# Patient Record
Sex: FEMALE | Race: WHITE | Employment: OTHER
[De-identification: names, ages, dates, MRNs, and addresses within clinical notes are randomized per-mention and may not be internally consistent; named-entity substitution may affect disease eponyms.]

---

## 2017-07-15 ENCOUNTER — HEALTH MAINTENANCE LETTER (OUTPATIENT)
Age: 42
End: 2017-07-15

## 2019-08-23 ENCOUNTER — HOSPITAL ENCOUNTER (EMERGENCY)
Facility: CLINIC | Age: 44
Discharge: HOME | End: 2019-08-23
Attending: EMERGENCY MEDICINE | Admitting: EMERGENCY MEDICINE
Payer: COMMERCIAL

## 2019-08-23 ENCOUNTER — APPOINTMENT (OUTPATIENT)
Dept: CT IMAGING | Facility: CLINIC | Age: 44
End: 2019-08-23
Attending: EMERGENCY MEDICINE
Payer: COMMERCIAL

## 2019-08-23 ENCOUNTER — APPOINTMENT (OUTPATIENT)
Dept: ULTRASOUND IMAGING | Facility: CLINIC | Age: 44
End: 2019-08-23
Attending: EMERGENCY MEDICINE
Payer: COMMERCIAL

## 2019-08-23 VITALS
BODY MASS INDEX: 23.2 KG/M2 | OXYGEN SATURATION: 98 % | RESPIRATION RATE: 16 BRPM | HEART RATE: 74 BPM | TEMPERATURE: 98.2 F | DIASTOLIC BLOOD PRESSURE: 69 MMHG | SYSTOLIC BLOOD PRESSURE: 112 MMHG | WEIGHT: 142 LBS

## 2019-08-23 DIAGNOSIS — R10.11 RUQ ABDOMINAL PAIN: ICD-10-CM

## 2019-08-23 LAB
ALBUMIN SERPL-MCNC: 4.1 G/DL (ref 3.4–5)
ALBUMIN UR-MCNC: 10 MG/DL
ALP SERPL-CCNC: 62 U/L (ref 40–150)
ALT SERPL W P-5'-P-CCNC: 31 U/L (ref 0–50)
ANION GAP SERPL CALCULATED.3IONS-SCNC: 10 MMOL/L (ref 3–14)
APPEARANCE UR: CLEAR
AST SERPL W P-5'-P-CCNC: 17 U/L (ref 0–45)
BACTERIA #/AREA URNS HPF: ABNORMAL /HPF
BASOPHILS # BLD AUTO: 0 10E9/L (ref 0–0.2)
BASOPHILS NFR BLD AUTO: 0.2 %
BILIRUB SERPL-MCNC: 0.7 MG/DL (ref 0.2–1.3)
BILIRUB UR QL STRIP: NEGATIVE
BUN SERPL-MCNC: 18 MG/DL (ref 7–30)
CALCIUM SERPL-MCNC: 9 MG/DL (ref 8.5–10.1)
CHLORIDE SERPL-SCNC: 103 MMOL/L (ref 94–109)
CO2 SERPL-SCNC: 26 MMOL/L (ref 20–32)
COLOR UR AUTO: YELLOW
CREAT SERPL-MCNC: 0.6 MG/DL (ref 0.52–1.04)
DIFFERENTIAL METHOD BLD: NORMAL
EOSINOPHIL # BLD AUTO: 0.1 10E9/L (ref 0–0.7)
EOSINOPHIL NFR BLD AUTO: 1.8 %
ERYTHROCYTE [DISTWIDTH] IN BLOOD BY AUTOMATED COUNT: 13.3 % (ref 10–15)
GFR SERPL CREATININE-BSD FRML MDRD: >90 ML/MIN/{1.73_M2}
GLUCOSE SERPL-MCNC: 83 MG/DL (ref 70–99)
GLUCOSE UR STRIP-MCNC: NEGATIVE MG/DL
HCG UR QL: NEGATIVE
HCT VFR BLD AUTO: 44.5 % (ref 35–47)
HGB BLD-MCNC: 14.7 G/DL (ref 11.7–15.7)
HGB UR QL STRIP: NEGATIVE
IMM GRANULOCYTES # BLD: 0 10E9/L (ref 0–0.4)
IMM GRANULOCYTES NFR BLD: 0 %
INTERNAL QC OK POCT: YES
KETONES UR STRIP-MCNC: NEGATIVE MG/DL
LEUKOCYTE ESTERASE UR QL STRIP: ABNORMAL
LIPASE SERPL-CCNC: 133 U/L (ref 73–393)
LYMPHOCYTES # BLD AUTO: 1.7 10E9/L (ref 0.8–5.3)
LYMPHOCYTES NFR BLD AUTO: 39.1 %
MCH RBC QN AUTO: 28.9 PG (ref 26.5–33)
MCHC RBC AUTO-ENTMCNC: 33 G/DL (ref 31.5–36.5)
MCV RBC AUTO: 87 FL (ref 78–100)
MONOCYTES # BLD AUTO: 0.4 10E9/L (ref 0–1.3)
MONOCYTES NFR BLD AUTO: 8 %
NEUTROPHILS # BLD AUTO: 2.2 10E9/L (ref 1.6–8.3)
NEUTROPHILS NFR BLD AUTO: 50.9 %
NITRATE UR QL: NEGATIVE
NRBC # BLD AUTO: 0 10*3/UL
NRBC BLD AUTO-RTO: 0 /100
PH UR STRIP: 7 PH (ref 5–7)
PLATELET # BLD AUTO: 226 10E9/L (ref 150–450)
POTASSIUM SERPL-SCNC: 4.1 MMOL/L (ref 3.4–5.3)
PROT SERPL-MCNC: 8.2 G/DL (ref 6.8–8.8)
RBC # BLD AUTO: 5.09 10E12/L (ref 3.8–5.2)
RBC #/AREA URNS AUTO: 1 /HPF (ref 0–2)
SODIUM SERPL-SCNC: 139 MMOL/L (ref 133–144)
SOURCE: ABNORMAL
SP GR UR STRIP: 1.01 (ref 1–1.03)
SQUAMOUS #/AREA URNS AUTO: 3 /HPF (ref 0–1)
UROBILINOGEN UR STRIP-MCNC: NORMAL MG/DL (ref 0–2)
WBC # BLD AUTO: 4.4 10E9/L (ref 4–11)
WBC #/AREA URNS AUTO: 4 /HPF (ref 0–5)

## 2019-08-23 PROCEDURE — 76705 ECHO EXAM OF ABDOMEN: CPT

## 2019-08-23 PROCEDURE — 96361 HYDRATE IV INFUSION ADD-ON: CPT

## 2019-08-23 PROCEDURE — 25000128 H RX IP 250 OP 636: Performed by: EMERGENCY MEDICINE

## 2019-08-23 PROCEDURE — 74177 CT ABD & PELVIS W/CONTRAST: CPT

## 2019-08-23 PROCEDURE — 25000125 ZZHC RX 250: Performed by: EMERGENCY MEDICINE

## 2019-08-23 PROCEDURE — 81025 URINE PREGNANCY TEST: CPT | Performed by: EMERGENCY MEDICINE

## 2019-08-23 PROCEDURE — 80053 COMPREHEN METABOLIC PANEL: CPT | Performed by: EMERGENCY MEDICINE

## 2019-08-23 PROCEDURE — 99284 EMERGENCY DEPT VISIT MOD MDM: CPT | Mod: Z6 | Performed by: EMERGENCY MEDICINE

## 2019-08-23 PROCEDURE — 83690 ASSAY OF LIPASE: CPT | Performed by: EMERGENCY MEDICINE

## 2019-08-23 PROCEDURE — 99285 EMERGENCY DEPT VISIT HI MDM: CPT | Mod: 25

## 2019-08-23 PROCEDURE — 81001 URINALYSIS AUTO W/SCOPE: CPT | Performed by: EMERGENCY MEDICINE

## 2019-08-23 PROCEDURE — 85025 COMPLETE CBC W/AUTO DIFF WBC: CPT | Performed by: EMERGENCY MEDICINE

## 2019-08-23 PROCEDURE — 96360 HYDRATION IV INFUSION INIT: CPT | Mod: 59

## 2019-08-23 PROCEDURE — 25000132 ZZH RX MED GY IP 250 OP 250 PS 637: Performed by: EMERGENCY MEDICINE

## 2019-08-23 RX ORDER — SODIUM CHLORIDE 9 MG/ML
1000 INJECTION, SOLUTION INTRAVENOUS CONTINUOUS
Status: DISCONTINUED | OUTPATIENT
Start: 2019-08-23 | End: 2019-08-23 | Stop reason: HOSPADM

## 2019-08-23 RX ORDER — IBUPROFEN 600 MG/1
600 TABLET, FILM COATED ORAL ONCE
Status: COMPLETED | OUTPATIENT
Start: 2019-08-23 | End: 2019-08-23

## 2019-08-23 RX ORDER — IOPAMIDOL 755 MG/ML
100 INJECTION, SOLUTION INTRAVASCULAR ONCE
Status: COMPLETED | OUTPATIENT
Start: 2019-08-23 | End: 2019-08-23

## 2019-08-23 RX ORDER — PANTOPRAZOLE SODIUM 20 MG/1
20 TABLET, DELAYED RELEASE ORAL DAILY
Qty: 14 TABLET | Refills: 0 | Status: SHIPPED | OUTPATIENT
Start: 2019-08-23 | End: 2019-09-06

## 2019-08-23 RX ORDER — SODIUM CHLORIDE 9 MG/ML
INJECTION, SOLUTION INTRAVENOUS
Status: DISCONTINUED
Start: 2019-08-23 | End: 2019-08-23 | Stop reason: HOSPADM

## 2019-08-23 RX ADMIN — IBUPROFEN 600 MG: 600 TABLET ORAL at 12:58

## 2019-08-23 RX ADMIN — SODIUM CHLORIDE 1000 ML: 9 INJECTION, SOLUTION INTRAVENOUS at 14:16

## 2019-08-23 RX ADMIN — IOPAMIDOL 69 ML: 755 INJECTION, SOLUTION INTRAVENOUS at 14:43

## 2019-08-23 RX ADMIN — LIDOCAINE HYDROCHLORIDE 30 ML: 20 SOLUTION ORAL; TOPICAL at 14:16

## 2019-08-23 RX ADMIN — SODIUM CHLORIDE 57 ML: 9 INJECTION, SOLUTION INTRAVENOUS at 14:43

## 2019-08-23 ASSESSMENT — ENCOUNTER SYMPTOMS
NAUSEA: 0
ABDOMINAL PAIN: 1
DIARRHEA: 1
DYSURIA: 0
FEVER: 0
COUGH: 0
BLOOD IN STOOL: 0
HEMATURIA: 0
VOMITING: 0

## 2019-08-23 NOTE — ED PROVIDER NOTES
[facility] [place] EMERGENCY DEPARTMENT ([facility])    8/23/19        History     Chief Complaint   Patient presents with     Abdominal Pain     Right upper quadrant pain; pain keeps pt up at night; pain started a couple months ago but very sporadic; increased duration and occurance the past week.     The history is provided by the patient and medical records.     Fiona Mcneal is a 44 year old female with a past medical history significant for migraines who presents here to the Emergency Department from  due to RUQ abdominal pain. Patient reports that she has noticed occasional abdominal pain over the last few months. She notes that these pains would randomly occur and would notice while bending up after stretching while working out. She notes that her pain has been increasing over the past few weeks, noting that over this past 1 week her pain has been even worse. She notes 2 episodes of pain yesterday that made her unable to move due to the severity of the pain. Patient states that she had stayed up late last night due to her pain and was concerned. She notes initially waking up this morning feeling well, however, her symptoms continued around 9 AM. She notes that her pain comes in waves but has been pretty constant today. Notes that the pain is a stabbing sensation. She notes that her RUQ abdominal pain is exacerbated by eating as well as bending/leaning over. Notes that she is currently on the Keto diet. Notes bloating of the abdomen after eating. Denies any current nausea but states on 8/16/2019 she had a bout of nausea where it felt like she was going to vomit that went away on its own. She also noted diarrhea from 8/18/2019-8/21/2019 that had resolved. Denied blood in her stool. Denies fever, hematuria, dysuria, vaginal discharge or vaginal bleeding. Denies chest pain or cough.  She denies any hematemesis, melena, or hematochezia.  Denies taking anything for the pain. Denies reflux or ulcers in the  past. Denies previous surgeries. Denies daily medications. She denies any anticoagulation. She reports she does intermittently smoke marijuana with last use last night. She states that she does not use this daily. She denies any alcohol use, tobacco use, or other illicit drug use. No recen trauma.     I have reviewed the Medications, Allergies, Past Medical and Surgical History, and Social History in the Epic system.    History reviewed. No pertinent past medical history.    History reviewed. No pertinent surgical history.    Family History   Problem Relation Age of Onset     Cerebrovascular Disease Father      Chemical Addiction Brother         Drugs Abuse       Social History     Tobacco Use     Smoking status: Never Smoker   Substance Use Topics     Alcohol use: No       No current facility-administered medications for this encounter.      Current Outpatient Medications   Medication     pantoprazole (PROTONIX) 20 MG EC tablet      No Known Allergies      Review of Systems   Constitutional: Negative for fever.   Respiratory: Negative for cough.    Cardiovascular: Negative for chest pain.   Gastrointestinal: Positive for abdominal pain (RUQ, stabbing) and diarrhea (Resolved). Negative for blood in stool, nausea and vomiting.   Genitourinary: Negative for dysuria, hematuria, vaginal bleeding and vaginal discharge.   All other systems reviewed and are negative.      Physical Exam   BP: 128/80  Pulse: 88  Temp: 98.2  F (36.8  C)  Resp: 14  Weight: 64.4 kg (142 lb)  SpO2: 96 %      Physical Exam   Constitutional: She is oriented to person, place, and time. She appears well-developed and well-nourished. No distress.   HENT:   Head: Normocephalic and atraumatic.   Mouth/Throat: No oropharyngeal exudate.   Eyes: Pupils are equal, round, and reactive to light. EOM are normal.   Neck: Normal range of motion. Neck supple.   Cardiovascular: Normal rate, regular rhythm, normal heart sounds and intact distal pulses.   No murmur  heard.  Pulmonary/Chest: Effort normal and breath sounds normal. No respiratory distress. She has no wheezes. She has no rales.   Abdominal: Soft. Bowel sounds are normal. She exhibits no distension and no mass. There is tenderness in the right upper quadrant. There is no rigidity, no rebound, no guarding, no CVA tenderness and no tenderness at McBurney's point.   Equivocal wheatley sign   Musculoskeletal: Normal range of motion. She exhibits no edema or tenderness.   Neurological: She is alert and oriented to person, place, and time. She has normal strength. No cranial nerve deficit or sensory deficit. GCS eye subscore is 4. GCS verbal subscore is 5. GCS motor subscore is 6.   Skin: Skin is warm and dry. Capillary refill takes less than 2 seconds. No rash noted.   Psychiatric: She has a normal mood and affect.   Vitals reviewed.      ED Course   12:27 PM  The patient was seen and examined by Tenisha Upton MD in Room ED02.        Procedures            Critical Care time:  none         Labs Ordered and Resulted from Time of ED Arrival Up to the Time of Departure from the ED   ROUTINE UA WITH MICROSCOPIC - Abnormal; Notable for the following components:       Result Value    Protein Albumin Urine 10 (*)     Leukocyte Esterase Urine Moderate (*)     Bacteria Urine Few (*)     Squamous Epithelial /HPF Urine 3 (*)     All other components within normal limits   HCG QUAL URINE POCT - Normal   CBC WITH PLATELETS DIFFERENTIAL   COMPREHENSIVE METABOLIC PANEL   LIPASE     CT Abdomen Pelvis w Contrast   Final Result   IMPRESSION:   1. Appendix not identified.   2. There are two small collapsing right ovarian cysts. Mild lower   pelvic fluid.       GULSHAN PATEL MD      Abdomen US, limited (RUQ only)   Final Result   IMPRESSION:     1.  No sonographic evidence of cholelithiasis or cholecystitis.   2.  Hepatomegaly.      PRABHA SHARPE MD                 Assessments & Plan (with Medical Decision Making)   On exam, patient is  overall well-appearing in no acute distress.  Vital signs are within normal limits and she is afebrile.  She was sent here from urgent care for further evaluation of right upper quadrant abdominal pain.  On exam she does have tenderness in the right upper quadrant area without rebound or guarding or signs of peritonitis.  She has an equivocal Hughes sign.  She does not have any lower abdominal tenderness or tenderness at McBurney's point to suggest appendicitis at this time.  Differential diagnosis includes but is not limited to gastritis, ulcer, pancreatitis, gallbladder pathology, marijuana hyperemesis,UTI, or musculoskeletal etiology.  We did consider more rare etiologies such as to Santi syndrome however the patient denies any history of STDs or concerns for recurrent STDs and has no change in her vaginal discharge so we felt this was less likely.  We did obtain laboratory studies as above which were all largely unremarkable.  Urinalysis is without evidence of UTI or hematuria.  Lipase is within normal limits.  Urine pregnancy is negative.  We did offer the patient the pain control and she requested ibuprofen.  We also provided her with a GI cocktail.  She also received IV fluids.  She was denying any current nausea and has not had any vomiting and thus we felt that marijuana induced hyperemesis would be less likely.    Right upper quadrant ultrasound did not reveal any evidence of cholelithiasis or cholecystitis.  There is no other evidence of acute abnormality.  Her liver was slightly enlarged.  Her examinations and liver function tests were within normal limits on her CMP.  Thus we felt that a calculus cholecystitis would be unlikely at this time.  On reevaluation she was still having tenderness on pelvis with contrast for further evaluation.  This revealed 2 small collapsing right ovarian cysts without other evidence of acute pathology.  There is no evidence of Ryland-Keron Santi syndrome.  I did speak with  the radiologist of the appendix was not definitely identified.  They did not see any signs suggestive of an inflammatory process or ruptured appendicitis.  On multiple abdominal re-evaluations the patient did not have any tenderness in the right lower quadrant or at McBurney's point and thus we felt that appendicitis was very unlikely.  Blood cell count was also within normal limits here.  On reevaluation after CT scan the patient was feeling improved and she did feel as though the GI cocktail was the most helpful.  Did discuss with her that the exact etiology of her pain is unknown at this time.  Thus we discussed the importance of her follow-up with her primary care provider in 3 days for reevaluation.  She was given a prescription for Protonix to take and that she could use Maalox at home as well.  She was given strict return precautions including worsening abdominal pain, localization of the pain to the right lower quadrant, fevers, repetitive vomiting, stool or vomit, or any new or worsening concerns.  She voiced understanding and was comfortable with this plan.  She was discharged home in improved condition.    I have reviewed the nursing notes.    I have reviewed the findings, diagnosis, plan and need for follow up with the patient.    Discharge Medication List as of 8/23/2019  4:12 PM      START taking these medications    Details   pantoprazole (PROTONIX) 20 MG EC tablet Take 1 tablet (20 mg) by mouth daily for 14 days, Disp-14 tablet, R-0, Local Print             Final diagnoses:   RUQ abdominal pain     Nate GUPTA, am serving as a trained medical scribe to document services personally performed by Tenisha Upton MD, based on the provider's statements to me.   Tenisha GUPTA MD, was physically present and have reviewed and verified the accuracy of this note documented by Nate Slade.    8/23/2019   [facility], EMERGENCY DEPARTMENT     Tenisha Upton MD  08/27/19 2309

## 2019-08-23 NOTE — ED NOTES
Pt discharged to home with self for transport. Pt stable at time of discharge. Verbalized understanding of printed discharge instructions. Denies further question for the provider.

## 2019-08-23 NOTE — DISCHARGE INSTRUCTIONS
Please make an appointment to follow up with Your Primary Care Provider in 3 days on Monday for re-evaluation. Take the protonix as prescribed. Can also take maalox at home for pain control. Return to the ED if you develop worsening pain, fevers, repetitive vomiting, pain localizing to the right lower quadrant, blood in the stool or vomit, or any new or worsening concerns.

## 2019-08-23 NOTE — ED AVS SNAPSHOT
[facility], [place], Emergency Department  [address]  [facility] [place] [address]  Phone:  [phone]  Fax:  [phone]                                    Fiona Mcneal   MRN: [id number]    Department:  [facility], Emergency Department   Date of Visit:  8/23/2019           After Visit Summary Signature Page    I have received my discharge instructions, and my questions have been answered. I have discussed any challenges I see with this plan with the nurse or doctor.    ..........................................................................................................................................  Patient/Patient Representative Signature      ..........................................................................................................................................  Patient Representative Print Name and Relationship to Patient    ..................................................               ................................................  Date                                   Time    ..........................................................................................................................................  Reviewed by Signature/Title    ...................................................              ..............................................  Date                                               Time          22EPIC Rev 08/18

## 2021-09-04 ENCOUNTER — HOSPITAL ENCOUNTER (EMERGENCY)
Facility: CLINIC | Age: 46
Discharge: HOME | End: 2021-09-04
Attending: EMERGENCY MEDICINE | Admitting: EMERGENCY MEDICINE
Payer: COMMERCIAL

## 2021-09-04 ENCOUNTER — APPOINTMENT (OUTPATIENT)
Dept: CT IMAGING | Facility: CLINIC | Age: 46
End: 2021-09-04
Attending: EMERGENCY MEDICINE
Payer: COMMERCIAL

## 2021-09-04 VITALS
SYSTOLIC BLOOD PRESSURE: 116 MMHG | HEART RATE: 88 BPM | OXYGEN SATURATION: 99 % | TEMPERATURE: 98.7 F | DIASTOLIC BLOOD PRESSURE: 63 MMHG | RESPIRATION RATE: 18 BRPM

## 2021-09-04 DIAGNOSIS — K57.32 DIVERTICULITIS OF COLON: ICD-10-CM

## 2021-09-04 LAB
ALBUMIN SERPL-MCNC: 3.3 G/DL (ref 3.4–5)
ALBUMIN UR-MCNC: NEGATIVE MG/DL
ALP SERPL-CCNC: 33 U/L (ref 40–150)
ALT SERPL W P-5'-P-CCNC: 32 U/L (ref 0–50)
ANION GAP SERPL CALCULATED.3IONS-SCNC: 5 MMOL/L (ref 3–14)
APPEARANCE UR: CLEAR
AST SERPL W P-5'-P-CCNC: 15 U/L (ref 0–45)
BASOPHILS # BLD AUTO: 0 10E3/UL (ref 0–0.2)
BASOPHILS NFR BLD AUTO: 0 %
BILIRUB SERPL-MCNC: 0.5 MG/DL (ref 0.2–1.3)
BILIRUB UR QL STRIP: NEGATIVE
BUN SERPL-MCNC: 12 MG/DL (ref 7–30)
CALCIUM SERPL-MCNC: 8.4 MG/DL (ref 8.5–10.1)
CHLORIDE BLD-SCNC: 102 MMOL/L (ref 94–109)
CO2 SERPL-SCNC: 28 MMOL/L (ref 20–32)
COLOR UR AUTO: ABNORMAL
CREAT SERPL-MCNC: 0.6 MG/DL (ref 0.52–1.04)
EOSINOPHIL # BLD AUTO: 0.1 10E3/UL (ref 0–0.7)
EOSINOPHIL NFR BLD AUTO: 1 %
ERYTHROCYTE [DISTWIDTH] IN BLOOD BY AUTOMATED COUNT: 12.7 % (ref 10–15)
GFR SERPL CREATININE-BSD FRML MDRD: >90 ML/MIN/1.73M2
GLUCOSE BLD-MCNC: 80 MG/DL (ref 70–99)
GLUCOSE UR STRIP-MCNC: NEGATIVE MG/DL
HCG UR QL: NEGATIVE
HCT VFR BLD AUTO: 38.7 % (ref 35–47)
HGB BLD-MCNC: 13.2 G/DL (ref 11.7–15.7)
HGB UR QL STRIP: NEGATIVE
IMM GRANULOCYTES # BLD: 0 10E3/UL
IMM GRANULOCYTES NFR BLD: 0 %
KETONES UR STRIP-MCNC: NEGATIVE MG/DL
LEUKOCYTE ESTERASE UR QL STRIP: NEGATIVE
LIPASE SERPL-CCNC: 120 U/L (ref 73–393)
LYMPHOCYTES # BLD AUTO: 1.8 10E3/UL (ref 0.8–5.3)
LYMPHOCYTES NFR BLD AUTO: 19 %
MCH RBC QN AUTO: 29.9 PG (ref 26.5–33)
MCHC RBC AUTO-ENTMCNC: 34.1 G/DL (ref 31.5–36.5)
MCV RBC AUTO: 88 FL (ref 78–100)
MONOCYTES # BLD AUTO: 0.7 10E3/UL (ref 0–1.3)
MONOCYTES NFR BLD AUTO: 8 %
MUCOUS THREADS #/AREA URNS LPF: PRESENT /LPF
NEUTROPHILS # BLD AUTO: 6.8 10E3/UL (ref 1.6–8.3)
NEUTROPHILS NFR BLD AUTO: 72 %
NITRATE UR QL: NEGATIVE
NRBC # BLD AUTO: 0 10E3/UL
NRBC BLD AUTO-RTO: 0 /100
PH UR STRIP: 7 [PH] (ref 5–7)
PLATELET # BLD AUTO: 193 10E3/UL (ref 150–450)
POTASSIUM BLD-SCNC: 3.7 MMOL/L (ref 3.4–5.3)
PROT SERPL-MCNC: 6.9 G/DL (ref 6.8–8.8)
RBC # BLD AUTO: 4.42 10E6/UL (ref 3.8–5.2)
RBC URINE: 1 /HPF
SODIUM SERPL-SCNC: 135 MMOL/L (ref 133–144)
SP GR UR STRIP: 1.01 (ref 1–1.03)
SQUAMOUS EPITHELIAL: 1 /HPF
UROBILINOGEN UR STRIP-MCNC: NORMAL MG/DL
WBC # BLD AUTO: 9.3 10E3/UL (ref 4–11)
WBC URINE: <1 /HPF

## 2021-09-04 PROCEDURE — 80053 COMPREHEN METABOLIC PANEL: CPT | Performed by: EMERGENCY MEDICINE

## 2021-09-04 PROCEDURE — 99285 EMERGENCY DEPT VISIT HI MDM: CPT | Performed by: EMERGENCY MEDICINE

## 2021-09-04 PROCEDURE — 250N000009 HC RX 250: Performed by: EMERGENCY MEDICINE

## 2021-09-04 PROCEDURE — 96375 TX/PRO/DX INJ NEW DRUG ADDON: CPT | Performed by: EMERGENCY MEDICINE

## 2021-09-04 PROCEDURE — 81025 URINE PREGNANCY TEST: CPT | Performed by: EMERGENCY MEDICINE

## 2021-09-04 PROCEDURE — 250N000011 HC RX IP 250 OP 636: Performed by: EMERGENCY MEDICINE

## 2021-09-04 PROCEDURE — 99285 EMERGENCY DEPT VISIT HI MDM: CPT | Mod: 25 | Performed by: EMERGENCY MEDICINE

## 2021-09-04 PROCEDURE — 96374 THER/PROPH/DIAG INJ IV PUSH: CPT | Mod: 59 | Performed by: EMERGENCY MEDICINE

## 2021-09-04 PROCEDURE — 83690 ASSAY OF LIPASE: CPT | Performed by: EMERGENCY MEDICINE

## 2021-09-04 PROCEDURE — 36415 COLL VENOUS BLD VENIPUNCTURE: CPT | Performed by: EMERGENCY MEDICINE

## 2021-09-04 PROCEDURE — 74177 CT ABD & PELVIS W/CONTRAST: CPT

## 2021-09-04 PROCEDURE — 250N000013 HC RX MED GY IP 250 OP 250 PS 637: Performed by: EMERGENCY MEDICINE

## 2021-09-04 PROCEDURE — 85025 COMPLETE CBC W/AUTO DIFF WBC: CPT | Performed by: EMERGENCY MEDICINE

## 2021-09-04 PROCEDURE — 81001 URINALYSIS AUTO W/SCOPE: CPT | Performed by: EMERGENCY MEDICINE

## 2021-09-04 RX ORDER — HYDROMORPHONE HYDROCHLORIDE 1 MG/ML
0.5 INJECTION, SOLUTION INTRAMUSCULAR; INTRAVENOUS; SUBCUTANEOUS
Status: COMPLETED | OUTPATIENT
Start: 2021-09-04 | End: 2021-09-04

## 2021-09-04 RX ORDER — ONDANSETRON 2 MG/ML
4 INJECTION INTRAMUSCULAR; INTRAVENOUS ONCE
Status: COMPLETED | OUTPATIENT
Start: 2021-09-04 | End: 2021-09-04

## 2021-09-04 RX ORDER — IOPAMIDOL 755 MG/ML
100 INJECTION, SOLUTION INTRAVASCULAR ONCE
Status: COMPLETED | OUTPATIENT
Start: 2021-09-04 | End: 2021-09-04

## 2021-09-04 RX ORDER — CIPROFLOXACIN 500 MG/1
500 TABLET, FILM COATED ORAL ONCE
Status: COMPLETED | OUTPATIENT
Start: 2021-09-04 | End: 2021-09-04

## 2021-09-04 RX ORDER — HYDROMORPHONE HCL IN WATER/PF 6 MG/30 ML
0.2 PATIENT CONTROLLED ANALGESIA SYRINGE INTRAVENOUS
Status: DISCONTINUED | OUTPATIENT
Start: 2021-09-04 | End: 2021-09-04

## 2021-09-04 RX ORDER — OXYCODONE HYDROCHLORIDE 5 MG/1
5 TABLET ORAL EVERY 6 HOURS PRN
Qty: 8 TABLET | Refills: 0 | Status: SHIPPED | OUTPATIENT
Start: 2021-09-04

## 2021-09-04 RX ORDER — METRONIDAZOLE 500 MG/1
500 TABLET ORAL ONCE
Status: COMPLETED | OUTPATIENT
Start: 2021-09-04 | End: 2021-09-04

## 2021-09-04 RX ORDER — METRONIDAZOLE 500 MG/1
500 TABLET ORAL 3 TIMES DAILY
Qty: 30 TABLET | Refills: 0 | Status: SHIPPED | OUTPATIENT
Start: 2021-09-04 | End: 2021-09-14

## 2021-09-04 RX ORDER — CIPROFLOXACIN 500 MG/1
500 TABLET, FILM COATED ORAL 2 TIMES DAILY
Qty: 20 TABLET | Refills: 0 | Status: SHIPPED | OUTPATIENT
Start: 2021-09-04

## 2021-09-04 RX ADMIN — SODIUM CHLORIDE 57 ML: 9 INJECTION, SOLUTION INTRAVENOUS at 03:00

## 2021-09-04 RX ADMIN — CIPROFLOXACIN 500 MG: 500 TABLET, COATED ORAL at 04:11

## 2021-09-04 RX ADMIN — IOPAMIDOL 69 ML: 755 INJECTION, SOLUTION INTRAVENOUS at 02:57

## 2021-09-04 RX ADMIN — METRONIDAZOLE 500 MG: 500 TABLET ORAL at 04:11

## 2021-09-04 RX ADMIN — ONDANSETRON 4 MG: 2 INJECTION INTRAMUSCULAR; INTRAVENOUS at 01:42

## 2021-09-04 RX ADMIN — HYDROMORPHONE HYDROCHLORIDE 0.5 MG: 1 INJECTION, SOLUTION INTRAMUSCULAR; INTRAVENOUS; SUBCUTANEOUS at 01:42

## 2021-09-04 ASSESSMENT — ENCOUNTER SYMPTOMS
CONFUSION: 0
COLOR CHANGE: 0
FATIGUE: 0
FREQUENCY: 0
CHEST TIGHTNESS: 0
DIZZINESS: 0
ABDOMINAL PAIN: 1
ABDOMINAL DISTENTION: 0
WEAKNESS: 0
BACK PAIN: 0
VOMITING: 0
MYALGIAS: 0
ARTHRALGIAS: 0
FEVER: 0
PALPITATIONS: 0
SHORTNESS OF BREATH: 0
DIARRHEA: 1
EYE PAIN: 0
NECK PAIN: 0
CONSTIPATION: 0
DIFFICULTY URINATING: 0
COUGH: 0
SORE THROAT: 0
CHILLS: 1
NAUSEA: 1
DYSURIA: 0
HEADACHES: 0

## 2021-09-04 NOTE — ED TRIAGE NOTES
Gradual onset and worsening of LLQ but diffuse lower abd pain that began around 1 PM worse around 8 PM. Last week it felt like she was getting a stomach ulcer. Reports she has passing gas and had a little diarhea that was tan in color with red chunks but admits she ate a beef stick.  + nausea. Denies fever but rpeorts when on the toilet she was having temps of 99 and cold chills.

## 2021-09-04 NOTE — DISCHARGE INSTRUCTIONS
Your symptoms are caused by diverticulitis.  You are prescribed 2 antibiotics (metronidazole and ciprofloxacin), which you should take as directed.  Initially, you should consume only a liquid diet.  Nutritional supplementation such as protein shakes are okay.  Once your symptoms begin to improve, you can start consuming a low fiber diet.  See attached information for dietary recommendations.  It is important that you follow-up with your primary care doctor in 3 to 5 days for reassessment of your symptoms.  Return to the emergency department immediately if you develop any worsening abdominal pain, fevers, intractable nausea/vomiting, or feel that you are worsening in any way.

## 2021-09-04 NOTE — ED PROVIDER NOTES
ED Provider Note  [facility]      History     Chief Complaint   Patient presents with     Abdominal Pain     Nausea     The history is provided by the patient and medical records.     Fiona Mcneal is a 46 year old otherwise healthy female who presents to the ED for an evaluation of LLQ abdominal pain and nausea. Patient reports she was having abdominal pain last week but took acid reducers and she started to feel better. Today at 1pm she had abdominal cramping that continues to worsen. She states she is having difficulty passing gas or stool since the pain began. She came from work at 4:30 and used heating pads to relieve the pain. Patient notes nausea, but no vomiting. She tried to make a BM later on in the evening and started to have sharp stabbing pains but did pass a little gas and diarrhea. She felt mildly better after that. She took a 30 minute nap but woke up with sharp pains again. She states the pain is constant but there are intermittent stabbing pain episodes. Denies pain radiating to the groin. She notes small red dots in her stool but does not know if that was blood or the beef jerky she ate earlier in the day. No vaginal pain or discharge, but there has been an abnormal odor for the last week. She states that she has the urgency to pee, but it takes a minute for her to start urinating.  Has not had menstrual cycle in years. Patient reports leg cramping for the past two months and was seen and treated for that by her PCP. Denies surgical history. Denies tobacco or alcohol use, but does smoke marijuana.    Past Medical History  History reviewed. No pertinent past medical history.  History reviewed. No pertinent surgical history.  ciprofloxacin (CIPRO) 500 MG tablet  metroNIDAZOLE (FLAGYL) 500 MG tablet  oxyCODONE (ROXICODONE) 5 MG tablet      No Known Allergies  Family History  Family History   Problem Relation Age of Onset     Cerebrovascular Disease Father       Chemical Addiction Brother         Drugs Abuse     Social History   Social History     Tobacco Use     Smoking status: Never Smoker   Substance Use Topics     Alcohol use: No     Drug use: No      Past medical history, past surgical history, medications, allergies, family history, and social history were reviewed with the patient. No additional pertinent items.       Review of Systems   Constitutional: Positive for chills. Negative for fatigue and fever.   HENT: Negative for congestion and sore throat.    Eyes: Negative for pain and visual disturbance.   Respiratory: Negative for cough, chest tightness and shortness of breath.    Cardiovascular: Negative for chest pain and palpitations.   Gastrointestinal: Positive for abdominal pain (LLQ), diarrhea and nausea. Negative for abdominal distention, constipation and vomiting.   Genitourinary: Negative for difficulty urinating, dysuria, frequency and urgency.   Musculoskeletal: Negative for arthralgias, back pain, myalgias and neck pain.   Skin: Negative for color change and rash.   Neurological: Negative for dizziness, weakness and headaches.   Psychiatric/Behavioral: Negative for confusion.     A complete review of systems was performed with pertinent positives and negatives noted in the HPI, and all other systems negative.    Physical Exam   BP: 113/78  Pulse: 97  Temp: 98.6  F (37  C)  Resp: 18  SpO2: 97 %  Physical Exam  Vitals and nursing note reviewed.   Constitutional:       General: She is not in acute distress.     Appearance: Normal appearance. She is not ill-appearing or toxic-appearing.   HENT:      Head: Normocephalic and atraumatic.      Nose: Nose normal.      Mouth/Throat:      Mouth: Mucous membranes are moist.   Eyes:      Pupils: Pupils are equal, round, and reactive to light.   Cardiovascular:      Rate and Rhythm: Normal rate.      Pulses: Normal pulses.      Heart sounds: Normal heart sounds.   Pulmonary:      Effort: Pulmonary effort is normal. No  respiratory distress.      Breath sounds: Normal breath sounds.   Abdominal:      General: Abdomen is flat. There is no distension.      Palpations: There is no mass.      Tenderness: There is no right CVA tenderness or left CVA tenderness.      Hernia: No hernia is present.      Comments: Mildly tender to palpation in the left lower quadrant.  No guarding rebound or other peritoneal signs.  Abdomen is otherwise soft.   Musculoskeletal:         General: No swelling or deformity. Normal range of motion.      Cervical back: Normal range of motion. No rigidity.   Skin:     General: Skin is warm.      Capillary Refill: Capillary refill takes less than 2 seconds.   Neurological:      Mental Status: She is alert and oriented to person, place, and time.   Psychiatric:         Mood and Affect: Mood normal.         ED Course      Procedures       The medical record was reviewed and interpreted.  Current labs reviewed and interpreted.  Previous labs reviewed and interpreted.  Managed outpatient prescription medications.       Results for orders placed or performed during the hospital encounter of 09/04/21   CT Abdomen Pelvis w Contrast     Status: None    Narrative    EXAM: CT ABDOMEN PELVIS W CONTRAST  LOCATION: [facility]  DATE/TIME: 9/4/2021 2:49 AM    INDICATION: Left lower quadrant pain.  COMPARISON: CT from 08/23/2019.  TECHNIQUE: CT scan of the abdomen and pelvis was performed following injection of IV contrast. Multiplanar reformats were obtained. Dose reduction techniques were used.  CONTRAST: 69mL isovue-370    FINDINGS:   LOWER CHEST: Normal.    HEPATOBILIARY: Normal gallbladder. Slight reticulation of the enhancement of the peripheral liver parenchyma, particularly in segments 5 and 6. Trace perihepatic fluid. The liver is enlarged, measuring 22 cm craniocaudal.    PANCREAS: Normal.    SPLEEN: There are a few scattered peripheral hypodensities in the spleen which are  stable compared to the prior CT.    ADRENAL GLANDS: Normal.    KIDNEYS/BLADDER: Symmetric enhancement. No hydronephrosis. Normal bladder.    BOWEL: Distal colonic diverticulosis. Marked wall thickening of the mid sigmoid colon with adjacent pericolonic edema. Normal caliber small bowel. No free air. Appendix not visualized with certainty.    LYMPH NODES: Normal.    VASCULATURE: Unremarkable.    PELVIC ORGANS: Pronounced parametrial vessels which can reflect a component of pelvic venous congestion. Small amount of free fluid.    MUSCULOSKELETAL: Normal.      Impression    IMPRESSION:   1.  Acute diverticulitis of the sigmoid colon. No free air. There is free fluid throughout the pelvis.    2.  Hepatomegaly with reticulated enhancement of the liver periphery which could reflect a component of passive venous congestion.   CBC with platelets differential     Status: None    Narrative    The following orders were created for panel order CBC with platelets differential.  Procedure                               Abnormality         Status                     ---------                               -----------         ------                     CBC with platelets and d...[[id number]]                      Final result                 Please view results for these tests on the individual orders.   Comprehensive metabolic panel     Status: Abnormal   Result Value Ref Range    Sodium 135 133 - 144 mmol/L    Potassium 3.7 3.4 - 5.3 mmol/L    Chloride 102 94 - 109 mmol/L    Carbon Dioxide (CO2) 28 20 - 32 mmol/L    Anion Gap 5 3 - 14 mmol/L    Urea Nitrogen 12 7 - 30 mg/dL    Creatinine 0.60 0.52 - 1.04 mg/dL    Calcium 8.4 (L) 8.5 - 10.1 mg/dL    Glucose 80 70 - 99 mg/dL    Alkaline Phosphatase 33 (L) 40 - 150 U/L    AST 15 0 - 45 U/L    ALT 32 0 - 50 U/L    Protein Total 6.9 6.8 - 8.8 g/dL    Albumin 3.3 (L) 3.4 - 5.0 g/dL    Bilirubin Total 0.5 0.2 - 1.3 mg/dL    GFR Estimate >90 >60 mL/min/1.73m2   Lipase     Status: Normal    Result Value Ref Range    Lipase 120 73 - 393 U/L   UA with Microscopic reflex to Culture     Status: Abnormal    Specimen: Urine, Clean Catch   Result Value Ref Range    Color Urine Light Yellow Colorless, Straw, Light Yellow, Yellow    Appearance Urine Clear Clear    Glucose Urine Negative Negative mg/dL    Bilirubin Urine Negative Negative    Ketones Urine Negative Negative mg/dL    Specific Gravity Urine 1.012 1.003 - 1.035    Blood Urine Negative Negative    pH Urine 7.0 5.0 - 7.0    Protein Albumin Urine Negative Negative mg/dL    Urobilinogen Urine Normal Normal, 2.0 mg/dL    Nitrite Urine Negative Negative    Leukocyte Esterase Urine Negative Negative    Mucus Urine Present (A) None Seen /LPF    RBC Urine 1 <=2 /HPF    WBC Urine <1 <=5 /HPF    Squamous Epithelials Urine 1 <=1 /HPF    Narrative    Urine Culture not indicated   HCG qualitative urine (UPT)     Status: Normal   Result Value Ref Range    hCG Urine Qualitative Negative Negative   CBC with platelets and differential     Status: None   Result Value Ref Range    WBC Count 9.3 4.0 - 11.0 10e3/uL    RBC Count 4.42 3.80 - 5.20 10e6/uL    Hemoglobin 13.2 11.7 - 15.7 g/dL    Hematocrit 38.7 35.0 - 47.0 %    MCV 88 78 - 100 fL    MCH 29.9 26.5 - 33.0 pg    MCHC 34.1 31.5 - 36.5 g/dL    RDW 12.7 10.0 - 15.0 %    Platelet Count 193 150 - 450 10e3/uL    % Neutrophils 72 %    % Lymphocytes 19 %    % Monocytes 8 %    % Eosinophils 1 %    % Basophils 0 %    % Immature Granulocytes 0 %    NRBCs per 100 WBC 0 <1 /100    Absolute Neutrophils 6.8 1.6 - 8.3 10e3/uL    Absolute Lymphocytes 1.8 0.8 - 5.3 10e3/uL    Absolute Monocytes 0.7 0.0 - 1.3 10e3/uL    Absolute Eosinophils 0.1 0.0 - 0.7 10e3/uL    Absolute Basophils 0.0 0.0 - 0.2 10e3/uL    Absolute Immature Granulocytes 0.0 <=0.0 10e3/uL    Absolute NRBCs 0.0 10e3/uL     Medications   HYDROmorphone (PF) (DILAUDID) injection 0.5 mg (0.5 mg Intravenous Given 9/4/21 0142)   ondansetron (ZOFRAN) injection 4 mg (4  mg Intravenous Given 9/4/21 0142)   iopamidol (ISOVUE-370) solution 100 mL (69 mLs Intravenous Given 9/4/21 0257)   sodium chloride 0.9 % bag 100mL (57 mLs Intravenous Given 9/4/21 0300)   ciprofloxacin (CIPRO) tablet 500 mg (500 mg Oral Given 9/4/21 0411)   metroNIDAZOLE (FLAGYL) tablet 500 mg (500 mg Oral Given 9/4/21 0411)        Assessments & Plan (with Medical Decision Making)   Patient presents to the ED with complaint of left lower quadrant pain.    Parenteral diagnosis includes diverticulitis, ureterolithiasis, acute cystitis, pyelonephritis, colitis, ovarian cyst, ovarian torsion    On arrival, patient has normal vital signs.  She appears uncomfortable due to the pain, but is otherwise nontoxic.  She has tenderness to palpation left lower quadrant without any peritoneal signs.    No lipase elevation or left upper quadrant pain to suggest pancreatitis.  No transaminitis to suggest hepatitis.  No right upper quadrant tenderness or bilirubin elevation to suggest acute cholecystitis.  No CVA tenderness to suggest pyelonephritis.  Urinalysis without evidence of infection.  Urine hCG negative.    CT scan shows evidence of diverticulitis.  There is free fluid in the pelvis.  I discussed this finding with the radiologist, he feels that this is likely related to the inflammation surrounding diverticular disease.  Does not appear hemorrhagic.  No evidence of perforation.  No abscess or phlegmon.  Findings consistent with Hinchey class 0.    Patient symptoms likely consistent with mild diverticulitis.  I offered admission to the hospital for pain control, patient states that she would rather trial home therapy with antibiotics.  First dose of Cipro Flagyl given in the ED.  Short course of oxycodone provided for pain.  We discussed dietary modifications.  I informed her that these can sometimes worsen despite antibiotics and if her pain worsens or she develops fevers, she should return to the emergency department  immediately for reevaluation.  Otherwise can follow-up with PCP in 1 week.    I have reviewed the nursing notes. I have reviewed the findings, diagnosis, plan and need for follow up with the patient.    Discharge Medication List as of 9/4/2021  4:20 AM      START taking these medications    Details   ciprofloxacin (CIPRO) 500 MG tablet Take 1 tablet (500 mg) by mouth 2 times daily, Disp-20 tablet, R-0, Local Print      metroNIDAZOLE (FLAGYL) 500 MG tablet Take 1 tablet (500 mg) by mouth 3 times daily for 10 days, Disp-30 tablet, R-0, Local PrintEat yogurt or cottage cheese daily to prevent diarrhea that can be caused by taking this medication.      oxyCODONE (ROXICODONE) 5 MG tablet Take 1 tablet (5 mg) by mouth every 6 hours as needed for pain, Disp-8 tablet, R-0, Local Print             Final diagnoses:   Diverticulitis of colon       --  I, Cinda Mandujano, am serving as a trained medical scribe to document services personally performed by Brayan Monte DO, based on the provider's statements to me.     IBrayan DO, was physically present and have reviewed and verified the accuracy of this note documented by Cinda Mandujano.    Brayan Monte DO  [facility] EMERGENCY DEPARTMENT  9/4/2021     Brayan Monte DO  09/07/21 0258